# Patient Record
Sex: FEMALE | Race: WHITE
[De-identification: names, ages, dates, MRNs, and addresses within clinical notes are randomized per-mention and may not be internally consistent; named-entity substitution may affect disease eponyms.]

---

## 2018-07-10 ENCOUNTER — HOSPITAL ENCOUNTER (OUTPATIENT)
Dept: HOSPITAL 47 - LABPAT | Age: 38
Discharge: HOME | End: 2018-07-10
Payer: COMMERCIAL

## 2018-07-10 DIAGNOSIS — Z01.812: Primary | ICD-10-CM

## 2018-07-10 LAB
BASOPHILS # BLD AUTO: 0 K/UL (ref 0–0.2)
BASOPHILS NFR BLD AUTO: 0 %
EOSINOPHIL # BLD AUTO: 0.1 K/UL (ref 0–0.7)
EOSINOPHIL NFR BLD AUTO: 2 %
ERYTHROCYTE [DISTWIDTH] IN BLOOD BY AUTOMATED COUNT: 4.15 M/UL (ref 3.8–5.4)
ERYTHROCYTE [DISTWIDTH] IN BLOOD: 12.7 % (ref 11.5–15.5)
HCT VFR BLD AUTO: 38.9 % (ref 34–46)
HGB BLD-MCNC: 12.9 GM/DL (ref 11.4–16)
LYMPHOCYTES # SPEC AUTO: 1.2 K/UL (ref 1–4.8)
LYMPHOCYTES NFR SPEC AUTO: 26 %
MCH RBC QN AUTO: 31.1 PG (ref 25–35)
MCHC RBC AUTO-ENTMCNC: 33.1 G/DL (ref 31–37)
MCV RBC AUTO: 93.8 FL (ref 80–100)
MONOCYTES # BLD AUTO: 0.3 K/UL (ref 0–1)
MONOCYTES NFR BLD AUTO: 6 %
NEUTROPHILS # BLD AUTO: 3 K/UL (ref 1.3–7.7)
NEUTROPHILS NFR BLD AUTO: 64 %
PLATELET # BLD AUTO: 288 K/UL (ref 150–450)
WBC # BLD AUTO: 4.7 K/UL (ref 3.8–10.6)

## 2018-07-10 PROCEDURE — 85025 COMPLETE CBC W/AUTO DIFF WBC: CPT

## 2018-07-10 PROCEDURE — 36415 COLL VENOUS BLD VENIPUNCTURE: CPT

## 2018-07-18 NOTE — P.HPOB
History of Present Illness


H&P Date: 18


Chief Complaint: JAYLA-3





This is a 37-year-old female  3 para 3 who presents for loop 

electrocautery excision procedure with colposcopy due to JAYLA-3 found on 

colposcopy recently.  Her Pap smear showed high-grade squamous intraepithelial 

lesion with positive high risk HPV.  Her colposcopy results showed JAYLA-2 and 3.

  She has consented to the above noted procedure for definitive treatment.





Obstetrical history: .  History of 3 vaginal deliveries.


Gynecologic history: No history of sexual transmitted diseases.  This was her 

first abnormal Pap smear and was on 2018 showing high-grade squamous 

intraepithelial lesion with positive high risk HPV.


Social history: She is .  She has a current partner since 2018.

  She currently is using condoms.





Review of Systems


Constitutional: Reports fatigue, Denies chills, Denies fever


Eyes: denies blurred vision, denies pain


Ears, nose, mouth and throat: Denies headache, Denies sore throat


Cardiovascular: Denies chest pain, Denies shortness of breath


Respiratory: Denies cough


Gastrointestinal: Reports heartburn, Denies abdominal pain


Genitourinary: Denies dysuria, Denies hematuria


Menstruation: Reports period normal


Musculoskeletal: Denies myalgias


Integumentary: Denies pruritus, Denies rash


Neurological: Denies numbness, Denies weakness


Psychiatric: Denies anxiety, Denies depression


Endocrine: Reports fatigue, Denies weight change


Hematologic/Lymphatic: Reports easy bruising





Past Medical History


Past Medical History: No Reported History


History of Any Multi-Drug Resistant Organisms: None Reported


Past Surgical History: Breast Surgery


Additional Past Surgical History / Comment(s): Br Implants


Past Anesthesia/Blood Transfusion Reactions: No Reported Reaction


Past Psychological History: No Psychological Hx Reported


Smoking Status: Current every day smoker


Past Alcohol Use History: None Reported


Past Drug Use History: None Reported





- Past Family History


  ** Mother


Family Medical History: Cancer


Additional Family Medical History / Comment(s): Cervical CA





Medications and Allergies


 Home Medications











 Medication  Instructions  Recorded  Confirmed  Type


 


Ibuprofen [Motrin] 600 mg PO Q8HR PRN 18 History


 


Multivitamins, Thera [Multivitamin 1 tab PO DAILY 18 History





(formulary)]    


 


Phentermine HCl [Adipex P] 15 mg PO DAILY 18 History











 Allergies











Allergy/AdvReac Type Severity Reaction Status Date / Time


 


Penicillins Allergy  Rash/Hives Verified 18 10:02














Exam


Osteopathic Statement: *.  No significant issues noted on an osteopathic 

structural exam other than those noted in the History and Physical/Consult.





HEENT: Within normal limits


Heart: Regular rate and rhythm


Lungs: Clear to auscultation bilaterally


Abdomen: Soft, nontender


Pelvic exam: Uterus is small, nontender, retroverted, with no adnexal masses or 

tenderness noted.


Extremities: Negative Homans





Assessment and Plan


(1) JAYLA III (cervical intraepithelial neoplasia III)


Status: Acute   Code(s): D06.9 - CARCINOMA IN SITU OF CERVIX, UNSPECIFIED   

SNOMED Code(s): 08460083


   


Plan: 





Proceed with loop electrocautery excision procedure with colposcopy.





I have discussed the risks, benefits, and alternative therapies for the above-

mentioned procedure and for both sedation/anesthesia as well as necessary blood 

products administration, if indicated, as they pertain to this patient.  The 

patient has indicated her understanding and acceptance of the risks and 

procedures discussed.

## 2018-07-19 ENCOUNTER — HOSPITAL ENCOUNTER (OUTPATIENT)
Dept: HOSPITAL 47 - OR | Age: 38
Discharge: HOME | End: 2018-07-19
Attending: OBSTETRICS & GYNECOLOGY
Payer: COMMERCIAL

## 2018-07-19 VITALS — RESPIRATION RATE: 16 BRPM

## 2018-07-19 VITALS — HEART RATE: 69 BPM | DIASTOLIC BLOOD PRESSURE: 61 MMHG | SYSTOLIC BLOOD PRESSURE: 100 MMHG

## 2018-07-19 VITALS — TEMPERATURE: 97.6 F

## 2018-07-19 VITALS — BODY MASS INDEX: 28.1 KG/M2

## 2018-07-19 DIAGNOSIS — Z79.899: ICD-10-CM

## 2018-07-19 DIAGNOSIS — Z79.1: ICD-10-CM

## 2018-07-19 DIAGNOSIS — K21.9: ICD-10-CM

## 2018-07-19 DIAGNOSIS — N87.1: Primary | ICD-10-CM

## 2018-07-19 DIAGNOSIS — Z88.0: ICD-10-CM

## 2018-07-19 PROCEDURE — 57460 BX OF CERVIX W/SCOPE LEEP: CPT

## 2018-07-19 PROCEDURE — 88307 TISSUE EXAM BY PATHOLOGIST: CPT

## 2018-07-19 PROCEDURE — 81025 URINE PREGNANCY TEST: CPT

## 2018-07-19 NOTE — P.OP
Date of Procedure: 18


Preoperative Diagnosis: 


JAYLA 3


Postoperative Diagnosis: 


Cervical dysplasia


Procedure(s) Performed: 


Colposcopy with loop electrocautery excision procedure


Anesthesia: CONSTANTINOA


Surgeon: Netta Glaser


Estimated Blood Loss (ml): 5


Pathology: other (Ectocervix with 6 o'clock position marked with a white suture 

and 12 o'clock position marked with a black suture, separate piece is 3 o'clock 

position)


Condition: stable


Disposition: same day


Indications for Procedure: 


This is a 37-year-old female  3 para 3 who presents for loop 

electrocautery excision procedure with colposcopy due to JAYLA-3 found on 

colposcopy recently.  Her Pap smear showed high-grade squamous intraepithelial 

lesion with positive high risk HPV.  Her colposcopy results showed JAYLA-2 and 3.

  She has consented to the above noted procedure for definitive treatment.


Operative Findings: 


Upon colposcopy, acetowhite and Lugol white areas were seen between 6 and 9 o'

clock position.  There is a separate area of Lugol white at approximately 9:00 

and at 12:00.  Some fine mosaicism was noted on the lower border.  Transition 

zone was seen entirely.


Description of Procedure: 


The patient was taken to the operating room where she is placed in the dorsal 

lithotomy position area and she is prepped and draped in the normal sterile 

fashion.  Bladder is straight with a catheter.  A coated bivalve speculum was 

placed in the patient's vagina.  Next colposcopy is performed.  Cervix is 

swabbed with acetic acid and blue light is used to visualize the cervix.  The 

above noted findings are made.  Next the cervix is swabbed with Lugol solution.

  The above noted findings are made.  Next the cervix was injected with a 50-50 

mixture of half percent Marcaine and 1% lidocaine with epinephrine.  

Approximately 6 mL were used to inject with a spinal needle circumferentially 

around the cervix.  Next a large cutting loop was used to swipe from left to 

right on the lower border.  Next the same procedure was used to swipe from left 

to right on the upper border.  35 W of cutting power were used.  There was a 

small separate piece that was removed from the 3:00 border.  Next ball-tipped 

cautery was used to cauterize the bed left behind.  Excellent hemostasis was 

noted.  Monsel solution was applied.  Excellent hemostasis was noted.  All 

instruments are removed from the vagina.  All sponge and needle counts are 

correct.  The patient is then taken to recovery room in stable condition.

## 2023-08-14 ENCOUNTER — HOSPITAL ENCOUNTER (OUTPATIENT)
Dept: HOSPITAL 47 - RADMAMWWP | Age: 43
Discharge: HOME | End: 2023-08-14
Attending: OBSTETRICS & GYNECOLOGY
Payer: COMMERCIAL

## 2023-08-14 DIAGNOSIS — Z98.82: ICD-10-CM

## 2023-08-14 DIAGNOSIS — Z12.31: Primary | ICD-10-CM

## 2023-08-14 PROCEDURE — 77067 SCR MAMMO BI INCL CAD: CPT

## 2023-08-15 NOTE — MM
Reason for Exam: Screening  (asymptomatic). 

Baseline mammogram.





Patient History: 

Menarche at age 14. First Full-Term Pregnancy at age 20. 2016, Bilateral Implants.

Mother had ovarian cancer under age 50.

Last menstrual period: 08/07/2023





Risk Values: 

Ruthy 5 year model risk: 0.5%.

NCI Lifetime model risk: 8.1%.





Prior Study Comparison: 

Patient's first Mammogram. No prior studies available for comparison. 





Tissue Density: 

There are scattered fibroglandular densities.





Findings: 

Analyzed By CAD. 

Breast (seen bilaterally.

Asymmetry left breast upper aspect on MLO view approximately 4.6 cm simple measuring 7 mm.

Right breast: There is no suspicious group of microcalcifications or new suspicious mass in either

breast. 





Overall Assessment: Incomplete: need additional imaging evaluation, BI-RAD 0





Management: 

Diagnostic Mammogram of the left breast.

[facility] will attempt to contact patient to return for supplemental views and

ultrasound if indicated.



Patient should continue monthly self-breast exams.  A clinical breast exam by your physician is

recommended on an annual basis.

This exam should not preclude additional follow-up of suspicious palpable abnormalities.



Note on Ruthy scores and lifetime risk:

1. A Ruthy score greater than 3% is considered moderate risk. If this is the case, consider

specialist referral to assess eligibility for a risk reducing agent.

2. If overall lifetime risk for the development of breast cancer is 20% or higher, the patient may

qualify for future screening with alternating mammogram and breast MRI.



Electronically signed and approved by: Lucas Torres DO

## 2023-08-22 ENCOUNTER — HOSPITAL ENCOUNTER (OUTPATIENT)
Dept: HOSPITAL 47 - RADMAMWWP | Age: 43
Discharge: HOME | End: 2023-08-22
Attending: OBSTETRICS & GYNECOLOGY
Payer: COMMERCIAL

## 2023-08-22 DIAGNOSIS — R92.8: Primary | ICD-10-CM

## 2023-08-22 PROCEDURE — 77065 DX MAMMO INCL CAD UNI: CPT

## 2023-08-22 PROCEDURE — 77061 BREAST TOMOSYNTHESIS UNI: CPT

## 2023-08-22 NOTE — MM
Reason for Exam: Additional evaluation requested from abnormal screening. 

Last screening mammogram was performed less than 1 month ago.





Patient History: 

Menarche at age 14. First Full-Term Pregnancy at age 20. 2016, Bilateral Implants.

Mother had ovarian cancer under age 50. 





Risk Values: 

Ruthy 5 year model risk: 0.5%.

NCI Lifetime model risk: 8.1%.





Prior Study Comparison: 

8/14/2023 Bilateral MG screening mammo implant/CAD, [facility]. 





Tissue Density: 

Left: The breast tissue is heterogeneously dense. This may lower the sensitivity of mammography.





Findings: 

Analyzed By CAD. 

No evidence for persistent nodule or mass. No suspicious calcifications seen. 





Overall Assessment: Negative, BI-RAD 1





Management: 

Screening Mammogram of both breasts in 1 year.

.  Results were given to the patient verbally at the time of exam.



Patient should continue monthly self-breast exams.  A clinical breast exam by your physician is

recommended on an annual basis.

This exam should not preclude additional follow-up of suspicious palpable abnormalities.





Note on Ruthy scores and lifetime risk:

1. A Ruthy score greater than 3% is considered moderate risk. If this is the case, consider

specialist referral to assess eligibility for a risk reducing agent.

2. If overall lifetime risk for the development of breast cancer is 20% or higher, the patient may

qualify for future screening with alternating mammogram and breast MRI.



Electronically signed and approved by: Leopold M. Fregoli, M.D. Radiologis

## 2025-03-03 ENCOUNTER — HOSPITAL ENCOUNTER (OUTPATIENT)
Dept: HOSPITAL 47 - RADUSWWP | Age: 45
Discharge: HOME | End: 2025-03-03
Attending: FAMILY MEDICINE
Payer: COMMERCIAL

## 2025-03-03 DIAGNOSIS — R19.09: Primary | ICD-10-CM

## 2025-03-03 NOTE — US
EXAMINATION TYPE: US groin RT

 

DATE OF EXAM: 3/3/2025

 

COMPARISON: NONE

 

CLINICAL INDICATION: Female, 44 years old with history of R19.09 OTHER INTRA-ABDOMINAL AND PELVIC SWE
LLING,; Right groin lump x years; More recently becoming painful; Patient denies any injury or surger
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 

 

TECHNIQUE:  Multiple grayscale and color Doppler ultrasound images in the right groin with and withou
t Valsalva maneuver was performed.

 

FINDINGS/IMPRESSION:  Suggested fat-containing hernia within the right inguinal canal at the patient'
s region of concern, moves with valsalva = 2.5 x 0.8 x 1.9 cm. This could be confirmed with CT as cli
nically indicated.

 

[facility], Workstation: [id number], 3/3/2025 1:58 PM